# Patient Record
Sex: MALE | Race: BLACK OR AFRICAN AMERICAN | NOT HISPANIC OR LATINO | Employment: OTHER | URBAN - METROPOLITAN AREA
[De-identification: names, ages, dates, MRNs, and addresses within clinical notes are randomized per-mention and may not be internally consistent; named-entity substitution may affect disease eponyms.]

---

## 2018-05-29 ENCOUNTER — OFFICE VISIT (OUTPATIENT)
Dept: ORTHOPEDIC SURGERY | Facility: CLINIC | Age: 68
End: 2018-05-29

## 2018-05-29 VITALS — WEIGHT: 212 LBS | BODY MASS INDEX: 34.07 KG/M2 | HEIGHT: 66 IN | TEMPERATURE: 97.9 F

## 2018-05-29 DIAGNOSIS — M54.5 CHRONIC LOW BACK PAIN, UNSPECIFIED BACK PAIN LATERALITY, WITH SCIATICA PRESENCE UNSPECIFIED: ICD-10-CM

## 2018-05-29 DIAGNOSIS — Z98.1 HISTORY OF LUMBAR FUSION: Primary | ICD-10-CM

## 2018-05-29 DIAGNOSIS — M40.209 KYPHOSIS, UNSPECIFIED KYPHOSIS TYPE, UNSPECIFIED SPINAL REGION: ICD-10-CM

## 2018-05-29 DIAGNOSIS — G89.29 CHRONIC LOW BACK PAIN, UNSPECIFIED BACK PAIN LATERALITY, WITH SCIATICA PRESENCE UNSPECIFIED: ICD-10-CM

## 2018-05-29 PROCEDURE — 72100 X-RAY EXAM L-S SPINE 2/3 VWS: CPT | Performed by: ORTHOPAEDIC SURGERY

## 2018-05-29 PROCEDURE — 99203 OFFICE O/P NEW LOW 30 MIN: CPT | Performed by: ORTHOPAEDIC SURGERY

## 2018-05-29 RX ORDER — HYDROCHLOROTHIAZIDE 25 MG/1
TABLET ORAL
COMMUNITY
Start: 2018-04-30

## 2018-05-29 RX ORDER — LOSARTAN POTASSIUM 50 MG/1
50 TABLET ORAL
COMMUNITY

## 2018-05-29 RX ORDER — RANITIDINE 150 MG/1
TABLET ORAL
COMMUNITY
Start: 2018-05-22

## 2018-05-29 RX ORDER — ACETAMINOPHEN 500 MG
500 TABLET ORAL
COMMUNITY

## 2018-05-29 RX ORDER — ATORVASTATIN CALCIUM 20 MG/1
TABLET, FILM COATED ORAL
COMMUNITY
Start: 2018-05-03

## 2018-05-29 NOTE — PROGRESS NOTES
New patient or new problem visit    Chief Complaint   Patient presents with   • Lumbar Spine - Pain       HPI: He complains of right leg pain and low back pain predominantly the latter ongoing over the last few years.  In 2010 Dr. Ruperto Villeda not performed a laminectomy and fusion from L3 to S1 from which she did well for a while.  The pain is moderate, intermittent, aching and worse with activity and relieved with NSAIDs.    PFSH: See chart- reviewed    Review of Systems   Constitutional: Positive for unexpected weight change (loss). Negative for chills and fever.   HENT: Positive for postnasal drip and tinnitus. Negative for trouble swallowing and voice change.    Eyes: Negative for visual disturbance.   Respiratory: Positive for cough. Negative for shortness of breath.    Cardiovascular: Negative for chest pain and leg swelling.   Gastrointestinal: Positive for nausea and vomiting. Negative for abdominal pain.   Endocrine: Negative for cold intolerance and heat intolerance.   Genitourinary: Positive for difficulty urinating. Negative for frequency and urgency.   Musculoskeletal: Positive for joint swelling and myalgias.   Skin: Negative for rash and wound.   Allergic/Immunologic: Negative for immunocompromised state.   Neurological: Positive for numbness (occ right hand and right leg). Negative for weakness.   Hematological: Does not bruise/bleed easily.   Psychiatric/Behavioral: Negative for dysphoric mood. The patient is not nervous/anxious.        PE: Constitutional: Vital signs above-noted.  Awake, alert and oriented    Psychiatric: Affect and insight do not appear grossly disturbed.    Pulmonary: Breathing is unlabored, color is good.    Skin: Warm, dry and normal turgor    Cardiac:  pedal pulses intact.  No edema.    Eyesight and hearing appear adequate for examination purposes      Musculoskeletal:  There is mild tenderness to percussion and palpation of the spine. Motion appears stiff.  Posture is stooped  to coronal and sagittal inspection.    The skin about the area is intact.  There is no palpable or visible deformity.  There is no local spasm.       Neurologic:   Reflexes are 2+ and symmetrical in the patellae and absent in the Achilles.   Motor function is undisturbed in quadriceps, EHL, and gastrocnemius      Sensation appears symmetrically intact to light touch   .  In the bilateral lower extremities there is no evidence of atrophy.   Clonus is absent..  Gait appears undisturbed.  SLR test negative      MEDICAL DECISION MAKING    XRAY: Plain film x-rays the lumbar spine suggest the solid fusion L3 to S1 with instrumentation which is slightly more stooped over the years compared to prior films.  Positive posterior lateral bone above but no significant screw loosening either.  I'll degenerative changes at the to 3 disc space are noted.    Other: n/a    Impression: He said level disc degeneration status post L3 to S1 laminectomy and fusion with instrumentation    Plan: Therapy for now follow-up when necessary

## 2018-06-12 ENCOUNTER — TELEPHONE (OUTPATIENT)
Dept: PHYSICAL THERAPY | Facility: HOSPITAL | Age: 68
End: 2018-06-12

## 2018-06-12 ENCOUNTER — HOSPITAL ENCOUNTER (OUTPATIENT)
Dept: PHYSICAL THERAPY | Facility: HOSPITAL | Age: 68
Setting detail: THERAPIES SERIES
Discharge: HOME | End: 2018-06-12
Attending: ORTHOPAEDIC SURGERY

## 2018-06-12 DIAGNOSIS — M54.41 CHRONIC BILATERAL LOW BACK PAIN WITH RIGHT-SIDED SCIATICA: Primary | ICD-10-CM

## 2018-06-12 DIAGNOSIS — G89.29 CHRONIC BILATERAL LOW BACK PAIN WITH RIGHT-SIDED SCIATICA: Primary | ICD-10-CM

## 2018-06-12 PROCEDURE — 97162 PT EVAL MOD COMPLEX 30 MIN: CPT | Performed by: PHYSICAL THERAPIST

## 2018-06-12 PROCEDURE — G8981 BODY POS CURRENT STATUS: HCPCS | Performed by: PHYSICAL THERAPIST

## 2018-06-12 PROCEDURE — 97110 THERAPEUTIC EXERCISES: CPT | Performed by: PHYSICAL THERAPIST

## 2018-06-12 PROCEDURE — G8982 BODY POS GOAL STATUS: HCPCS | Performed by: PHYSICAL THERAPIST

## 2018-06-12 NOTE — THERAPY EVALUATION
Outpatient Physical Therapy Ortho Initial Evaluation  [facility]     Patient Name: Yaakov Dejesus  : 1950  MRN: [id number]  Today's Date: 2018      Visit Date: 2018    There is no problem list on file for this patient.       Past Medical History:   Diagnosis Date   • Rheumatoid arthritis    • Sleep apnea    • Stomach ulcer    • Stroke         Past Surgical History:   Procedure Laterality Date   • BACK SURGERY      fusion   • OTHER SURGICAL HISTORY      compound skull fracture   • TONSILLECTOMY         Visit Dx:     ICD-10-CM ICD-9-CM   1. Chronic bilateral low back pain with right-sided sciatica M54.41 724.2    G89.29 724.3     338.29             Patient History     Row Name 18 1000             History    Chief Complaint Pain  -KJ      Type of Pain Back pain;Lower Extremity / Leg  -KJ      Date Current Problem(s) Began --   worsening over the last few months.   -KJ      Brief Description of Current Complaint Had fusion  and it was helpful. Noticed pain started getting worse as the years go on. Was told not to lift and has to do that for work ([place] [employer], also walks a lot). Pain down R leg, shooting pain. X-rays look good from surgery. Open to trying therpay but concerned about cost and how  much it takes. Not sure what can be done, and may just has to accept it. MD recommend therapy, has not mentioned pain management. No formal exercise program, kept up with exercises after therapy but stopped.   -KJ      Patient/Caregiver Goals Know what to do to help the symptoms  -KJ      What clinical tests have you had for this problem? X-ray  -KJ      Results of Clinical Tests Fusion looks OK  -KJ         Pain     Pain Location Back;Leg  -KJ      Pain at Present 5  -KJ      Pain at Best 3  -KJ      Pain at Worst 9  -KJ      What Performance Factors Make the Current Problem(s) WORSE? Stand too long, can wake up in pain  -KJ      What Performance Factors Make the Current Problem(s)  BETTER? Extra strength tyelnol, NSAIDs cause heart issue. Sitting, shower  -KJ      Is your sleep disturbed? --   Not as bad as it used to be, still some discomfort.   -KJ         Fall Risk Assessment    Any falls in the past year: No  -KJ        User Key  (r) = Recorded By, (t) = Taken By, (c) = Cosigned By    Initials Name Provider Type    EDUARD Hogan, PT Physical Therapist                PT Ortho     Row Name 06/12/18 1000       Sensory Screen for Light Touch- Lower Quarter Clearing    L1 (inguinal area) Bilateral:;Intact  -KJ    L2 (anterior mid thigh) Bilateral:;Intact  -KJ    L3 (distal anterior thigh) Bilateral:;Intact  -KJ    L4 (medial lower leg/foot) Bilateral:;Intact  -KJ       Myotomal Screen- Lower Quarter Clearing    Hip flexion (L2) Bilateral:;4+ (Good +)  -KJ    Knee extension (L3) Bilateral:;4+ (Good +)  -KJ    Ankle DF (L4) Bilateral:;4+ (Good +)  -KJ    Ankle PF (S1) Bilateral:;4+ (Good +)   tested in sitting  -KJ    Knee flexion (S2) Bilateral:;4+ (Good +)  -KJ       Lumbar ROM Screen- Lower Quarter Clearing    Lumbar Flexion Impaired   75% of WNL  -KJ    Lumbar Extension Impaired   50% of WNL for age, mild pain  -KJ    Lumbar Lateral Flexion Impaired   75% of WNL for age, mild pain  -KJ    Lumbar Rotation Impaired   75% of WNL for age, mild pain  -KJ       Lumbosacral Palpation    Piriformis Bilateral:;Tender;Guarded/taut   mild tender  -KJ    Quadratus Lumborum Bilateral:;Tender;Guarded/taut   mild tendder  -KJ    Erector Spinae (Paraspinals) Bilateral:;Tender;Guarded/taut   mild tender  -KJ       Lumbar/SI Special Tests    SLR (Neural Tension) Bilateral:;Negative  -KJ       Hip/Thigh Palpation    Gluteus Medius Bilateral:;Tender;Guarded/taut   mild tenderness  -KJ       Lower Extremity Flexibility    Hamstrings Bilateral:;Mildly limited;Moderately limited  -KJ    Hip External Rotators Bilateral:;Mildly limited  -KJ    Hip Internal Rotators Bilateral:;Mildly limited  -KJ      User Key   (r) = Recorded By, (t) = Taken By, (c) = Cosigned By    Initials Name Provider Type    EDUARD Hogan PT Physical Therapist                      Therapy Education  Education Details: Evaluation findings, at length discussion regarding healthy vs unhealthy activity, rest breaks, body mechanics, and realistic expectations.   Given: HEP, Symptoms/condition management, Pain management  Program: New  How Provided: Verbal, Demonstration, Written  Provided to: Patient  Level of Understanding: Teach back education performed, Verbalized, Demonstrated           PT OP Goals     Row Name 06/12/18 1600          PT Short Term Goals    STG Date to Achieve 06/26/18  -KJ     STG 1 Pt. will be independent and compliant with initial home exercise program.  -KJ     STG 1 Progress New  -KJ     STG 2 Pt. will perform 15-20 reps of core stabilization exercises with proper form  -KJ     STG 2 Progress New  -KJ        Long Term Goals    LTG Date to Achieve 07/12/18  -KJ     LTG 1 Pt. will be independent and compliant with advanced home exercise program.  -KJ     LTG 1 Progress New  -KJ     LTG 2 Pt. will verbalize understanding of body mechanics and rest breaks.  -KJ     LTG 2 Progress New  -KJ     LTG 3 Pt. will score </=34% on Oswestry, indicating decreased perceived functional disability.  -KJ     LTG 3 Progress New  -KJ        Time Calculation    PT Goal Re-Cert Due Date 07/12/18  -KJ       User Key  (r) = Recorded By, (t) = Taken By, (c) = Cosigned By    Initials Name Provider Type    EDUARD Hogan, PT Physical Therapist                PT Assessment/Plan     Row Name 06/12/18 1624          PT Assessment    Functional Limitations Decreased safety during functional activities;Limitations in community activities;Performance in self-care ADL;Limitations in functional capacity and performance;Limitation in home management;Performance in leisure activities  -EDUARD     Impairments Pain;Range of motion;Joint mobility  -KJ      Assessment Comments Pt. is a 68 year old male with history of lumbar fusion referred to outpatient therapy for worsening low back and R LE pain over the past few months. 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 Pt. presents with decreased and mildly painful lumbar AROM, painful lower lumbar PIVM, increased muscle tension lumbosacral region, decreased B hamstring flexibility and Oswestry Disability score of 44% where 0% represents no perceived functional disability. Pt. will benefit from skilled physical therapy to address these issues and develop and home exercise program for stabilization and flexibility.   -KJ     Please refer to paper survey for additional self-reported information Yes  -KJ     Rehab Potential Good  -KJ     Patient/caregiver participated in establishment of treatment plan and goals Yes  -KJ     Patient would benefit from skilled therapy intervention Yes  -KJ        PT Plan    PT Frequency 2x/week;1x/week  -KJ     Predicted Duration of Therapy Intervention (Therapy Eval) 3-4 weeks  -KJ     PT Plan Comments See weekly for 3-4 weeks to develop and progress core stab and flexibility program.  -KJ       User Key  (r) = Recorded By, (t) = Taken By, (c) = Cosigned By    Initials Name Provider Type    EDUARD Hogan, PT Physical Therapist                  Exercises     Row Name 06/12/18 1100             Total Minutes    69244 - PT Therapeutic Exercise Minutes 16  -KJ         Exercise 1    Exercise Name 1 SKTC  -KJ      Reps 1 2  -KJ      Time 1 5 sec  -KJ         Exercise 2    Exercise Name 2 DKTC  -KJ      Reps 2 2  -KJ      Time 2 5 sec  -KJ         Exercise 3    Exercise Name 3 LTR  -KJ      Reps 3 3 ea  -KJ      Time 3 5 sec  -KJ         Exercise 4    Exercise Name 4 PPT  -KJ      Cueing 4 Verbal;Tactile;Demo  -KJ      Reps 4 5  -KJ         Exercise 5    Exercise Name 5 HS stretch at step  -KJ        User Key  (r) = Recorded By, (t) = Taken By, (c) =  Cosigned By    Initials Name Provider Type     Camille Hogan, PT Physical Therapist                        Outcome Measure Options: Modifed Owestry  Modified Oswestry  Modified Oswestry Score/Comments: 44%      Time Calculation:     Therapy Suggested Charges     Code   Minutes Charges    53894 (CPT®) Hc Pt Neuromusc Re Education Ea 15 Min      25179 (CPT®) Hc Pt Ther Proc Ea 15 Min 16 1    57212 (CPT®) Hc Gait Training Ea 15 Min      23438 (CPT®) Hc Pt Therapeutic Act Ea 15 Min      71247 (CPT®) Hc Pt Manual Therapy Ea 15 Min      12878 (CPT®) Hc Pt Ther Massage- Per 15 Min      70480 (CPT®) Hc Pt Iontophoresis Ea 15 Min      06073 (CPT®) Hc Pt Elec Stim Ea-Per 15 Min      74457 (CPT®) Hc Pt Ultrasound Ea 15 Min      81967 (CPT®) Hc Pt Self Care/Mgmt/Train Ea 15 Min      Total  16 1          Start Time: 1030  Stop Time: 1110  Time Calculation (min): 40 min  Total Timed Code Minutes- PT: 16 minute(s)     Therapy Charges for Today     Code Description Service Date Service Provider Modifiers Qty    73723230661 HC PT CHNG MAIN POS CURRENT 6/12/2018 Camille Hogan, PT GP, CK 1    73710916781 HC PT CHNG MAIN POS PROJECTED 6/12/2018 Camille Hogan, PT GP, CJ 1    28787124183 HC PT THER PROC EA 15 MIN 6/12/2018 Camille Hogan, PT GP 1    36573584488 HC PT EVAL MOD COMPLEXITY 2 6/12/2018 Camille Hogan, PT GP 1          PT G-Codes  PT Professional Judgement Used?: Yes  Outcome Measure Options: Modifed Owestry  Score: 44%  Functional Limitation: Changing and maintaining body position  Changing and Maintaining Body Position Current Status (): At least 40 percent but less than 60 percent impaired, limited or restricted  Changing and Maintaining Body Position Goal Status (): At least 20 percent but less than 40 percent impaired, limited or restricted         Camille Hogan, PT  6/12/2018

## 2018-06-19 ENCOUNTER — HOSPITAL ENCOUNTER (OUTPATIENT)
Dept: PHYSICAL THERAPY | Facility: HOSPITAL | Age: 68
Setting detail: THERAPIES SERIES
Discharge: HOME | End: 2018-06-19
Attending: ORTHOPAEDIC SURGERY

## 2018-06-19 DIAGNOSIS — M54.41 CHRONIC BILATERAL LOW BACK PAIN WITH RIGHT-SIDED SCIATICA: Primary | ICD-10-CM

## 2018-06-19 DIAGNOSIS — G89.29 CHRONIC BILATERAL LOW BACK PAIN WITH RIGHT-SIDED SCIATICA: Primary | ICD-10-CM

## 2018-06-19 PROCEDURE — 97110 THERAPEUTIC EXERCISES: CPT | Performed by: PHYSICAL THERAPIST

## 2018-06-19 NOTE — THERAPY TREATMENT NOTE
"    Outpatient Physical Therapy Ortho Treatment Note  [facility]     Patient Name: Yaakov Dejesus  : 1950  MRN: [id number]  Today's Date: 2018      Visit Date: 2018    Visit Dx:    ICD-10-CM ICD-9-CM   1. Chronic bilateral low back pain with right-sided sciatica M54.41 724.2    G89.29 724.3     338.29       There is no problem list on file for this patient.       Past Medical History:   Diagnosis Date   • Rheumatoid arthritis    • Sleep apnea    • Stomach ulcer    • Stroke         Past Surgical History:   Procedure Laterality Date   • BACK SURGERY      fusion   • OTHER SURGICAL HISTORY      compound skull fracture   • TONSILLECTOMY                               PT Assessment/Plan     Row Name 18 1025          PT Assessment    Assessment Comments First session since initial evaluation. Patient reports minimal compliance with MD reccomendations after surgery (years ago) and no compliance with exercises/stretches given at initial evaluation. Long discussion on the importance of self care of his back to prevent further deterioration and issues in the future. Verbalized understanding and stated he needs to \"work on his part.\" Reviewed initial exercises and added a few new ones. Reproduction of R buttock pain with piriformis stretch. Patient asked about back braces. Suggested obtaining one for when he performs heavy lifting/carrying (ie. [place] [employer] work) to assist with lumbar support.   -CK        PT Plan    PT Plan Comments Continue per outline. Assess compliance of HEP from last session.   -CK       User Key  (r) = Recorded By, (t) = Taken By, (c) = Cosigned By    Initials Name Provider Type    AKIN Perry, PT Physical Therapist                    Exercises     Row Name 18 0900             Subjective Comments    Subjective Comments I dont do what i am supposed to. I havent followed the reccomendations after my lumbar fusion, which is why I am here, and I havent " done the suggested exercises. No R leg pain today.  -CK         Subjective Pain    Able to rate subjective pain? yes  -CK      Pre-Treatment Pain Level 3  -CK         Total Minutes    65157 - PT Therapeutic Exercise Minutes 39  -CK         Exercise 1    Exercise Name 1 SKTC  -CK      Reps 1 2  -CK      Time 1 20 sec  -CK         Exercise 3    Exercise Name 3 LTR  -CK      Reps 3 10  -CK      Time 3 5 sec  -CK         Exercise 4    Exercise Name 4 PPT  -CK      Cueing 4 Verbal;Tactile;Demo  -CK      Reps 4 10  -CK      Additional Comments max verbal and tactile cues  -CK         Exercise 5    Exercise Name 5 HS stretch at step  -CK      Reps 5 2  -CK      Time 5 30 sec  -CK         Exercise 6    Exercise Name 6 Nustep warm up with TA  -CK      Time 6 6 min  -CK         Exercise 7    Exercise Name 7 piriformis stretch  -CK      Reps 7 3  -CK      Time 7 20 sec  -CK         Exercise 8    Exercise Name 8 hooklying hip abduction  -CK      Reps 8 15  -CK      Additional Comments RTB  -CK         Exercise 9    Exercise Name 9 shoulder ext  -CK      Reps 9 15  -CK      Additional Comments RTB  -CK        User Key  (r) = Recorded By, (t) = Taken By, (c) = Cosigned By    Initials Name Provider Type    CK Jose Daniel Perry, PT Physical Therapist                        Manual Rx (last 36 hours)      Manual Treatments     Row Name 06/19/18 0900             Manual Rx 1    Manual Rx 1 Location gross stm to lumbar/gluteal tissues  -CK      Manual Rx 1 Duration 6 min at end of session  -CK        User Key  (r) = Recorded By, (t) = Taken By, (c) = Cosigned By    Initials Name Provider Type    CK Jose Daniel Perry, PT Physical Therapist                PT OP Goals     Row Name 06/19/18 1000          PT Short Term Goals    STG Date to Achieve 06/26/18  -CK     STG 1 Pt. will be independent and compliant with initial home exercise program.  -CK     STG 1 Progress Ongoing  -CK     STG 1 Progress Comments not performed to date  -CK     STG 2 Pt.  will perform 15-20 reps of core stabilization exercises with proper form  -CK     STG 2 Progress Ongoing  -CK        Long Term Goals    LTG Date to Achieve 07/12/18  -CK     LTG 1 Pt. will be independent and compliant with advanced home exercise program.  -CK     LTG 1 Progress Ongoing  -CK     LTG 1 Progress Comments added 3 exercises today with new handout  -CK     LTG 2 Pt. will verbalize understanding of body mechanics and rest breaks.  -CK     LTG 2 Progress Ongoing  -CK     LTG 3 Pt. will score </=34% on Oswestry, indicating decreased perceived functional disability.  -CK     LTG 3 Progress Ongoing  -CK       User Key  (r) = Recorded By, (t) = Taken By, (c) = Cosigned By    Initials Name Provider Type    CK Jose Daniel Perry, PT Physical Therapist                         Time Calculation:   Start Time: 0930  Stop Time: 1015  Time Calculation (min): 45 min  Total Timed Code Minutes- PT: 45 minute(s)  Therapy Suggested Charges     Code   Minutes Charges    77816 (CPT®) Hc Pt Neuromusc Re Education Ea 15 Min      20985 (CPT®) Hc Pt Ther Proc Ea 15 Min 39 3    64843 (CPT®) Hc Gait Training Ea 15 Min      04242 (CPT®) Hc Pt Therapeutic Act Ea 15 Min      46678 (CPT®) Hc Pt Manual Therapy Ea 15 Min      53477 (CPT®) Hc Pt Ther Massage- Per 15 Min      55040 (CPT®) Hc Pt Iontophoresis Ea 15 Min      89882 (CPT®) Hc Pt Elec Stim Ea-Per 15 Min      84146 (CPT®) Hc Pt Ultrasound Ea 15 Min      28852 (CPT®) Hc Pt Self Care/Mgmt/Train Ea 15 Min      Total  39 3        Therapy Charges for Today     Code Description Service Date Service Provider Modifiers Qty    49451230405 HC PT THER PROC EA 15 MIN 6/19/2018 Jose Daniel Perry PT GP 3                    Jose Daniel Perry PT  6/19/2018

## 2018-06-26 ENCOUNTER — HOSPITAL ENCOUNTER (OUTPATIENT)
Dept: PHYSICAL THERAPY | Facility: HOSPITAL | Age: 68
Setting detail: THERAPIES SERIES
Discharge: HOME | End: 2018-06-26
Attending: ORTHOPAEDIC SURGERY

## 2018-06-26 DIAGNOSIS — G89.29 CHRONIC BILATERAL LOW BACK PAIN WITH RIGHT-SIDED SCIATICA: Primary | ICD-10-CM

## 2018-06-26 DIAGNOSIS — M54.41 CHRONIC BILATERAL LOW BACK PAIN WITH RIGHT-SIDED SCIATICA: Primary | ICD-10-CM

## 2018-06-26 PROCEDURE — 97110 THERAPEUTIC EXERCISES: CPT | Performed by: PHYSICAL THERAPIST

## 2018-06-26 NOTE — THERAPY TREATMENT NOTE
"    Outpatient Physical Therapy Ortho Treatment Note  [facility]     Patient Name: Yaakov Dejesus  : 1950  MRN: [id number]  Today's Date: 2018      Visit Date: 2018    Visit Dx:    ICD-10-CM ICD-9-CM   1. Chronic bilateral low back pain with right-sided sciatica M54.41 724.2    G89.29 724.3     338.29       There is no problem list on file for this patient.       Past Medical History:   Diagnosis Date   • Rheumatoid arthritis    • Sleep apnea    • Stomach ulcer    • Stroke         Past Surgical History:   Procedure Laterality Date   • BACK SURGERY      fusion   • OTHER SURGICAL HISTORY  1963    compound skull fracture   • TONSILLECTOMY                               PT Assessment/Plan     Row Name 18 0919          PT Assessment    Assessment Comments Patient reports minimal compliance with HEP since initiating sessions. Again reviewed the importance of compliance with stretching and strengthening in order to manage pain. He verbalized understanding. Requests one more treatment session to \"finalize HEP\" and then he can perform in home gym.   -CK        PT Plan    PT Plan Comments Finalize HEP and d/c to HEP.   -CK       User Key  (r) = Recorded By, (t) = Taken By, (c) = Cosigned By    Initials Name Provider Type    AKIN Perry, PT Physical Therapist                    Exercises     Row Name 18 0800             Subjective Comments    Subjective Comments I havent done my exercises until this morning since the last time I was here. I have constant low back pain  -CK         Subjective Pain    Able to rate subjective pain? yes  -CK      Pre-Treatment Pain Level 2  -CK         Total Minutes    82679 - PT Therapeutic Exercise Minutes 44  -CK         Exercise 1    Exercise Name 1 SKTC  -CK      Reps 1 1  -CK      Time 1 60 sec  -CK         Exercise 2    Exercise Name 2 DKTC  -CK      Reps 2 1  -CK      Time 2 20 sec  -CK         Exercise 3    Exercise Name 3 LTR  -CK      Reps 3 5 "  -CK      Time 3 5 sec  -CK         Exercise 4    Exercise Name 4 PPT  -CK      Cueing 4 Verbal;Tactile;Demo  -CK      Reps 4 10  -CK      Time 4 3 sec  -CK         Exercise 5    Exercise Name 5 HS stretch at step  -CK      Reps 5 3  -CK      Time 5 30 sec  -CK         Exercise 6    Exercise Name 6 Nustep warm up with TA  -CK      Time 6 5 min  -CK      Additional Comments L5 with TA  -CK         Exercise 7    Exercise Name 7 piriformis stretch/figure 4 stretch  -CK      Reps 7 2  -CK      Time 7 30 sec  -CK         Exercise 8    Exercise Name 8 hooklying hip abduction  -CK      Sets 8 2  -CK      Reps 8 0  -CK      Additional Comments RTB: Together and unilateral  -CK         Exercise 9    Exercise Name 9 shoulder ext  -CK      Sets 9 2  -CK      Reps 9 15  -CK      Additional Comments RTB: alternating and together  -CK         Exercise 10    Exercise Name 10 Bridges with adduction  -CK      Reps 10 15  -CK         Exercise 11    Exercise Name 11 s/L clamshells  -CK      Reps 11 15  -CK         Exercise 12    Exercise Name 12 Lat pulls  -CK      Reps 12 20  -CK      Additional Comments RTB  -CK        User Key  (r) = Recorded By, (t) = Taken By, (c) = Cosigned By    Initials Name Provider Type    CK Jose Daniel Perry, PT Physical Therapist                             Therapy Education  Given: HEP  Program: New  How Provided: Verbal, Demonstration, Written  Provided to: Patient  Level of Understanding: Teach back education performed, Verbalized              Time Calculation:   Start Time: 0830  Stop Time: 0915  Time Calculation (min): 45 min  Total Timed Code Minutes- PT: 45 minute(s)  Therapy Suggested Charges     Code   Minutes Charges    72325 (CPT®) Hc Pt Neuromusc Re Education Ea 15 Min      68451 (CPT®) Hc Pt Ther Proc Ea 15 Min 44 3    04214 (CPT®) Hc Gait Training Ea 15 Min      42493 (CPT®) Hc Pt Therapeutic Act Ea 15 Min      47240 (CPT®) Hc Pt Manual Therapy Ea 15 Min      38427 (CPT®) Hc Pt Ther Massage- Per  15 Min      11443 (CPT®) Hc Pt Iontophoresis Ea 15 Min      05961 (CPT®) Hc Pt Elec Stim Ea-Per 15 Min      03882 (CPT®) Hc Pt Ultrasound Ea 15 Min      85672 (CPT®) Hc Pt Self Care/Mgmt/Train Ea 15 Min      Total  44 3        Therapy Charges for Today     Code Description Service Date Service Provider Modifiers Qty    45851440076 HC PT THER PROC EA 15 MIN 6/26/2018 Jose Daniel Perry, PT GP 3                    Jose Daniel Perry, PT  6/26/2018

## 2018-07-03 ENCOUNTER — HOSPITAL ENCOUNTER (OUTPATIENT)
Dept: PHYSICAL THERAPY | Facility: HOSPITAL | Age: 68
Setting detail: THERAPIES SERIES
Discharge: HOME | End: 2018-07-03
Attending: ORTHOPAEDIC SURGERY

## 2018-07-03 DIAGNOSIS — M54.41 CHRONIC BILATERAL LOW BACK PAIN WITH RIGHT-SIDED SCIATICA: Primary | ICD-10-CM

## 2018-07-03 DIAGNOSIS — G89.29 CHRONIC BILATERAL LOW BACK PAIN WITH RIGHT-SIDED SCIATICA: Primary | ICD-10-CM

## 2018-07-03 PROCEDURE — 97110 THERAPEUTIC EXERCISES: CPT | Performed by: PHYSICAL THERAPIST

## 2018-07-03 NOTE — THERAPY DISCHARGE NOTE
Outpatient Physical Therapy Ortho Treatment Note  [facility]     Patient Name: Yaakov Dejesus  : 1950  MRN: [id number]  Today's Date: 7/3/2018      Visit Date: 2018    Visit Dx:    ICD-10-CM ICD-9-CM   1. Chronic bilateral low back pain with right-sided sciatica M54.41 724.2    G89.29 724.3     338.29       There is no problem list on file for this patient.       Past Medical History:   Diagnosis Date   • Rheumatoid arthritis (CMS/HCC)    • Sleep apnea    • Stomach ulcer    • Stroke (CMS/HCC)         Past Surgical History:   Procedure Laterality Date   • BACK SURGERY      fusion   • OTHER SURGICAL HISTORY  1963    compound skull fracture   • TONSILLECTOMY                       18 1509   OP PT Discharge Summary   Date of Discharge 18   Reason for Discharge Maximum functional potential achieved   Outcomes Achieved Patient able to partially acheive established goals   Discharge Destination Home with home program               PT Assessment/Plan     Row Name 18 1159          PT Assessment    Assessment Comments Pt. reporting improved compliance with HEP and verbalizes understanding in need to continue. Ready to d/c to HEP. Pt.'s overall prognosis somewhat limited by his lack of compliance and continuing to perform heavy lifting that is not advised with his back condition.   -KJ        PT Plan    PT Plan Comments D/C to HEp  -KJ       User Key  (r) = Recorded By, (t) = Taken By, (c) = Cosigned By    Initials Name Provider Type    EDUARD Hogan, PT Physical Therapist                Modalities     Row Name 18 0900             Moist Heat    Location Lumbar spine in supine 90/90   -KJ      Rx Minutes 15 mins  -KJ        User Key  (r) = Recorded By, (t) = Taken By, (c) = Cosigned By    Initials Name Provider Type    EDUARD Hogan, PT Physical Therapist                Exercises     Row Name 18 0900             Subjective Comments    Subjective Comments I  get a little sore after the new exercises, just using new muscles. I've been doing them every other day. I understood this would be my last visit and I'll go back to the MD and see what the next step is.   -KJ         Subjective Pain    Pre-Treatment Pain Level 5  -KJ         Total Minutes    69189 - PT Therapeutic Exercise Minutes 29  -KJ         Exercise 1    Exercise Name 1 SKTC  -KJ      Cueing 1 Verbal  -KJ      Reps 1 5  -KJ      Time 1 10  -KJ         Exercise 2    Exercise Name 2 DKTC  -KJ      Reps 2 5  -KJ      Time 2 10  -KJ         Exercise 3    Exercise Name 3 LTR  -KJ      Reps 3 5  -KJ      Time 3 5 sec  -KJ         Exercise 4    Exercise Name 4 PPT  -KJ      Cueing 4 Verbal;Tactile;Demo  -KJ      Reps 4 10  -KJ      Time 4 3 sec  -KJ         Exercise 5    Exercise Name 5 --  -KJ      Reps 5 --  -KJ      Time 5 --  -KJ         Exercise 6    Exercise Name 6 Nustep warm up with TA  -KJ      Time 6 5 min  -KJ      Additional Comments L4 with UE  -KJ         Exercise 7    Exercise Name 7 --  -KJ      Reps 7 --  -KJ      Time 7 --  -KJ         Exercise 8    Exercise Name 8 hooklying hip abduction  -KJ      Sets 8 2  -KJ      Reps 8 0  -KJ      Additional Comments RTB: Together and unilateral  -KJ         Exercise 9    Exercise Name 9 shoulder ext  -KJ      Sets 9 2  -KJ      Reps 9 15  -KJ      Additional Comments RTB: alternating and together  -KJ         Exercise 10    Exercise Name 10 Bridges with adduction  -KJ      Reps 10 15  -KJ         Exercise 11    Exercise Name 11 s/L clamshells  -KJ      Reps 11 15  -KJ         Exercise 12    Exercise Name 12 Lat pulls  -KJ      Reps 12 20  -KJ      Additional Comments RTB  -KJ        User Key  (r) = Recorded By, (t) = Taken By, (c) = Cosigned By    Initials Name Provider Type    EDUARD Hogan, PT Physical Therapist                               PT OP Goals     Row Name 07/03/18 1100          PT Short Term Goals    STG Date to Achieve 06/26/18  -KJ      STG 1 Pt. will be independent and compliant with initial home exercise program.  -KJ     STG 1 Progress Partially Met  -KJ     STG 1 Progress Comments Improved compliance noted today.   -KJ     STG 2 Pt. will perform 15-20 reps of core stabilization exercises with proper form  -KJ     STG 2 Progress Partially Met  -KJ     STG 2 Progress Comments Minimal cuing required.   -KJ        Long Term Goals    LTG Date to Achieve 07/12/18  -KJ     LTG 1 Pt. will be independent and compliant with advanced home exercise program.  -KJ     LTG 1 Progress Partially Met  -KJ     LTG 1 Progress Comments Improved compliance noted today.   -KJ     LTG 2 Pt. will verbalize understanding of body mechanics and rest breaks.  -KJ     LTG 2 Progress Partially Met  -KJ     LTG 2 Progress Comments Pt. requiring some cuing.   -KJ     LTG 3 Pt. will score </=34% on Oswestry, indicating decreased perceived functional disability.  -KJ     LTG 3 Progress Not Met  -KJ     LTG 3 Progress Comments Not formally assessed to date.   -KJ       User Key  (r) = Recorded By, (t) = Taken By, (c) = Cosigned By    Initials Name Provider Type    EDUARD Hogan, PT Physical Therapist          Therapy Education  Education Details: Importance of compliance with HEP.   Given: HEP  Program: Reinforced  How Provided: Verbal, Demonstration  Provided to: Patient  Level of Understanding: Teach back education performed, Verbalized, Demonstrated              Time Calculation:   Start Time: 0900  Stop Time: 0950  Time Calculation (min): 50 min  Therapy Suggested Charges     Code   Minutes Charges    85022 (CPT®)  Pt Neuromusc Re Education Ea 15 Min      94831 (CPT®)  Pt Ther Proc Ea 15 Min 29 2    53895 (CPT®) Hc Gait Training Ea 15 Min      78648 (CPT®)  Pt Therapeutic Act Ea 15 Min      63287 (CPT®)  Pt Manual Therapy Ea 15 Min      91756 (CPT®)  Pt Ther Massage- Per 15 Min      42465 (CPT®)  Pt Iontophoresis Ea 15 Min      32763 (CPT®)  Pt Elec Stim  Ea-Per 15 Min      47893 (CPT®) Hc Pt Ultrasound Ea 15 Min      82927 (CPT®) Hc Pt Self Care/Mgmt/Train Ea 15 Min      Total  29 2        Therapy Charges for Today     Code Description Service Date Service Provider Modifiers Qty    80941107218 HC PT THER PROC EA 15 MIN 7/3/2018 Camille Hogan, PT GP 2    93081721129 HC PT HOT OR COLD PACK TREAT MCARE 7/3/2018 Camille Hogan, PT GP 1                    Camille Hogan, PT  7/3/2018

## 2018-07-24 ENCOUNTER — OFFICE VISIT (OUTPATIENT)
Dept: ORTHOPEDIC SURGERY | Facility: CLINIC | Age: 68
End: 2018-07-24

## 2018-07-24 VITALS — WEIGHT: 212 LBS | HEIGHT: 66 IN | TEMPERATURE: 97.7 F | BODY MASS INDEX: 34.07 KG/M2

## 2018-07-24 DIAGNOSIS — M54.5 CHRONIC LOW BACK PAIN, UNSPECIFIED BACK PAIN LATERALITY, WITH SCIATICA PRESENCE UNSPECIFIED: Primary | ICD-10-CM

## 2018-07-24 DIAGNOSIS — G89.29 CHRONIC LOW BACK PAIN, UNSPECIFIED BACK PAIN LATERALITY, WITH SCIATICA PRESENCE UNSPECIFIED: Primary | ICD-10-CM

## 2018-07-24 PROCEDURE — 99213 OFFICE O/P EST LOW 20 MIN: CPT | Performed by: ORTHOPAEDIC SURGERY

## 2018-07-24 NOTE — PROGRESS NOTES
He states he's doing much better at this point to and underwent physical therapy and then now has now doing home exercises as a work out the room in his  apartment.  He exhibits normal strength and sensation on exam today and these exhibiting good posture.  Overall he is happy with his improvement and will continue home exercises I will see him back when necessary

## 2018-10-16 ENCOUNTER — DOCUMENTATION (OUTPATIENT)
Dept: PHYSICAL THERAPY | Facility: HOSPITAL | Age: 68
End: 2018-10-16

## 2018-10-16 NOTE — THERAPY DISCHARGE NOTE
Outpatient Physical Therapy Discharge Summary         Patient Name: Yaakov Dejesus  : 1950  MRN: [id number]    Today's Date: 10/16/2018  Duplicate discharge, opened in error.    Jose Daniel Perry PT, DPT

## 2021-12-21 ENCOUNTER — OFFICE VISIT (OUTPATIENT)
Dept: ORTHOPEDIC SURGERY | Facility: CLINIC | Age: 71
End: 2021-12-21

## 2021-12-21 VITALS — BODY MASS INDEX: 33.04 KG/M2 | HEIGHT: 68 IN | WEIGHT: 218 LBS | TEMPERATURE: 98.4 F

## 2021-12-21 DIAGNOSIS — M47.812 CERVICAL SPONDYLOSIS: ICD-10-CM

## 2021-12-21 DIAGNOSIS — M54.2 NECK PAIN: ICD-10-CM

## 2021-12-21 DIAGNOSIS — M54.50 LUMBAR PAIN: Primary | ICD-10-CM

## 2021-12-21 DIAGNOSIS — M48.061 SPINAL STENOSIS OF LUMBAR REGION, UNSPECIFIED WHETHER NEUROGENIC CLAUDICATION PRESENT: ICD-10-CM

## 2021-12-21 PROCEDURE — 99203 OFFICE O/P NEW LOW 30 MIN: CPT | Performed by: ORTHOPAEDIC SURGERY

## 2021-12-21 PROCEDURE — 72040 X-RAY EXAM NECK SPINE 2-3 VW: CPT | Performed by: ORTHOPAEDIC SURGERY

## 2021-12-21 PROCEDURE — 72100 X-RAY EXAM L-S SPINE 2/3 VWS: CPT | Performed by: ORTHOPAEDIC SURGERY

## 2021-12-21 RX ORDER — METHYLPREDNISOLONE 4 MG/1
TABLET ORAL
Qty: 21 TABLET | Refills: 0 | Status: SHIPPED | OUTPATIENT
Start: 2021-12-21

## 2021-12-23 NOTE — PROGRESS NOTES
New patient or new problem visit    CC: He complains of neck pain and low back pain    HPI: His primary complaint is neck pain which radiates the left shoulder.  It is now chronic and associated with mild pain with repetitive activity and some perception of weakness.  He has some numbness in the second and third fingers of each hand.  He also has some low back pain.  In 2008 Dr. Ruperto Villeda and I performed an L3-S1 laminectomy and fusion with instrumentation.  He did well from that.  No bowel or bladder complaints fever chills or weight loss    PFSH: See attached    ROS: As above    PE: On exam intact motor or sensory  testing in the upper and lower extremities.  Jen test is negative.  No evidence of clonus.    XRAY: Plain film x-rays of the lumbar spine show L3-S1 fusion with instrumentation and hyperlordosis, and anterolisthesis at L2-3.  This was barely present 3 years ago on x-rays obtained here and was not present in 2010 at L to 3.  Cervical spine films show loss of lordosis and large anterior spondylofights over the mid cervical region.  No comparison views are available for the cervical spine.    Other: n/a    Impression: L2-3 spondylolisthesis status post L3-S1 laminectomy fusion instrumentation.  Lumbar hyperlordosis.  Cervical spondylosis with radiculopathy.    Plan: I have recommended physical therapy for the back and the neck and I placed him on a Medrol Dosepak I will see him back as needed.

## 2022-01-03 ENCOUNTER — TELEPHONE (OUTPATIENT)
Dept: ORTHOPEDIC SURGERY | Facility: CLINIC | Age: 72
End: 2022-01-03

## 2022-01-03 NOTE — TELEPHONE ENCOUNTER
Caller: GAYE CUI    Relationship: PATIENT    Best call back number:  [phone]    What orders are you requesting (i.e. lab or imaging):  PHYSICAL THERAPY ORDERS IN Epic 12/21/21.    In what timeframe would the patient need to come in:     Where will you receive your lab/imaging services:  PLEASE FAX ORDER TO [facility] IN [place] @ [phone]